# Patient Record
Sex: FEMALE | Employment: UNEMPLOYED | URBAN - METROPOLITAN AREA
[De-identification: names, ages, dates, MRNs, and addresses within clinical notes are randomized per-mention and may not be internally consistent; named-entity substitution may affect disease eponyms.]

---

## 2017-02-10 ENCOUNTER — TELEPHONE (OUTPATIENT)
Dept: FAMILY MEDICINE CLINIC | Facility: CLINIC | Age: 27
End: 2017-02-10

## 2017-02-10 NOTE — TELEPHONE ENCOUNTER
[facility] said that they have received a fax with pt office visit notes but the office notes needs to explain why the CT was order stat. They must have this information asap.   GILLIAN:[phone]

## 2017-06-08 ENCOUNTER — OFFICE VISIT (OUTPATIENT)
Dept: FAMILY MEDICINE CLINIC | Facility: CLINIC | Age: 27
End: 2017-06-08

## 2017-06-08 VITALS
SYSTOLIC BLOOD PRESSURE: 108 MMHG | DIASTOLIC BLOOD PRESSURE: 75 MMHG | HEIGHT: 65 IN | TEMPERATURE: 98 F | HEART RATE: 80 BPM

## 2017-06-08 DIAGNOSIS — J06.9 ACUTE URI: ICD-10-CM

## 2017-06-08 DIAGNOSIS — B36.0 TINEA VERSICOLOR: ICD-10-CM

## 2017-06-08 DIAGNOSIS — R30.0 DYSURIA: Primary | ICD-10-CM

## 2017-06-08 DIAGNOSIS — Z87.448 HX OF PYELONEPHRITIS: ICD-10-CM

## 2017-06-08 DIAGNOSIS — N30.01 ACUTE CYSTITIS WITH HEMATURIA: ICD-10-CM

## 2017-06-08 PROCEDURE — 81002 URINALYSIS NONAUTO W/O SCOPE: CPT | Performed by: FAMILY MEDICINE

## 2017-06-08 PROCEDURE — 99214 OFFICE O/P EST MOD 30 MIN: CPT | Performed by: FAMILY MEDICINE

## 2017-06-08 PROCEDURE — 99213 OFFICE O/P EST LOW 20 MIN: CPT | Performed by: FAMILY MEDICINE

## 2017-06-08 RX ORDER — CIPROFLOXACIN 250 MG/1
250 TABLET, FILM COATED ORAL 2 TIMES DAILY
Qty: 10 TABLET | Refills: 0 | Status: SHIPPED | OUTPATIENT
Start: 2017-06-08 | End: 2017-06-18

## 2017-06-08 RX ORDER — NITROFURANTOIN 25; 75 MG/1; MG/1
100 CAPSULE ORAL 2 TIMES DAILY
Qty: 14 CAPSULE | Refills: 0 | Status: SHIPPED | OUTPATIENT
Start: 2017-06-08 | End: 2017-06-15

## 2017-06-08 RX ORDER — SELENIUM SULFIDE 2.5 MG/100ML
LOTION TOPICAL
Qty: 1 BOTTLE | Refills: 3 | Status: SHIPPED | OUTPATIENT
Start: 2017-06-08

## 2017-06-08 NOTE — PATIENT INSTRUCTIONS
Tinea Versicolor  This is a rash caused by a fungus in the top layers of the skin. This fungus is normally present in the pores of the skin and causes no symptoms. But when the fungus overgrows it causes a rash.  The fungus grows more easily in hot climat · Wear loose clothing. Try to let your skin stay cool and breathe. · Use sunscreen and protect yourself from sunlight  · Avoid tanning beds  Follow-up care  Follow up with your healthcare provider, or as advised.  Call your provider if the rash doesn’t get

## 2017-06-08 NOTE — TELEPHONE ENCOUNTER
Pt states that she was prescribed macrobid today for her UTI. When she looked at her records at home, she realized that she can't take macrobid. She is requesting cipro as this has worked for her in the past. Please advise on pended rx.

## 2017-06-08 NOTE — PROGRESS NOTES
Nichole Smith is a 28year old female. HPI:   Patient presents with symptoms of UTI. Complaining of urinary frequency, urgency, dysuria, suprapubic pain x 5 days. Denies back pain, fever, hematuria.     Has been fasting for ramadan    Patient has a prior hist increasing fluid intake, bladder emptying after intercourse. The patient indicates understanding of these issues and agrees to the plan. The patient is asked to return in 3 days if not better.  Call if fever, vomiting, worsening symptoms.    - POC Urinal

## 2017-06-12 ENCOUNTER — TELEPHONE (OUTPATIENT)
Dept: INTERNAL MEDICINE CLINIC | Facility: CLINIC | Age: 27
End: 2017-06-12

## 2017-06-12 NOTE — TELEPHONE ENCOUNTER
Spoke with pt ,  Results given. Pt already taking cipro and symptoms improving. Pt  aware to have pt call back if symptoms worsen.

## 2017-06-12 NOTE — TELEPHONE ENCOUNTER
----- Message from Katiuska Nunez MD sent at 6/11/2017 10:00 PM CDT -----  Urine culture shows Klebsiella Pneumoniae. More sensitive to ciprofloxacin.  If not improved, will recommend switching abx to cipro

## 2017-06-15 ENCOUNTER — TELEPHONE (OUTPATIENT)
Dept: FAMILY MEDICINE CLINIC | Facility: CLINIC | Age: 27
End: 2017-06-15

## 2017-06-15 ENCOUNTER — APPOINTMENT (OUTPATIENT)
Dept: LAB | Age: 27
End: 2017-06-15
Attending: FAMILY MEDICINE
Payer: COMMERCIAL

## 2017-06-15 DIAGNOSIS — N30.00 ACUTE CYSTITIS WITHOUT HEMATURIA: ICD-10-CM

## 2017-06-15 DIAGNOSIS — N30.00 ACUTE CYSTITIS WITHOUT HEMATURIA: Primary | ICD-10-CM

## 2017-06-15 PROCEDURE — 87086 URINE CULTURE/COLONY COUNT: CPT

## 2017-06-15 NOTE — TELEPHONE ENCOUNTER
Actions Requested: Pt asking how to proceed, having continued symptoms of UTI  Situation/Background   Problem: Burning with urination, urgency, frequency   Onset: Early June   Associated Symptoms: Denies fever, urine is clear.  Denies abd and back pain   Hi

## 2017-06-15 NOTE — TELEPHONE ENCOUNTER
Spoke to pt, informed her that  has ordered another urine culture. Pt agrees to plan, will go to lab to give sample.

## 2017-06-21 ENCOUNTER — TELEPHONE (OUTPATIENT)
Dept: FAMILY MEDICINE CLINIC | Facility: CLINIC | Age: 27
End: 2017-06-21

## 2017-06-21 NOTE — TELEPHONE ENCOUNTER
----- Message from Renny Romero MD sent at 6/17/2017  1:13 PM CDT -----  Urine culture shows no growth.   Please call to inform patient

## 2017-10-10 ENCOUNTER — OFFICE VISIT (OUTPATIENT)
Dept: FAMILY MEDICINE CLINIC | Facility: CLINIC | Age: 27
End: 2017-10-10

## 2017-10-10 ENCOUNTER — TELEPHONE (OUTPATIENT)
Dept: FAMILY MEDICINE CLINIC | Facility: CLINIC | Age: 27
End: 2017-10-10

## 2017-10-10 VITALS
DIASTOLIC BLOOD PRESSURE: 75 MMHG | TEMPERATURE: 99 F | SYSTOLIC BLOOD PRESSURE: 109 MMHG | BODY MASS INDEX: 32.82 KG/M2 | HEIGHT: 65 IN | WEIGHT: 197 LBS | HEART RATE: 73 BPM

## 2017-10-10 DIAGNOSIS — Z23 NEEDS FLU SHOT: ICD-10-CM

## 2017-10-10 DIAGNOSIS — R30.0 DYSURIA: Primary | ICD-10-CM

## 2017-10-10 DIAGNOSIS — N30.90 CYSTITIS: ICD-10-CM

## 2017-10-10 PROCEDURE — 90471 IMMUNIZATION ADMIN: CPT | Performed by: NURSE PRACTITIONER

## 2017-10-10 PROCEDURE — 81002 URINALYSIS NONAUTO W/O SCOPE: CPT | Performed by: NURSE PRACTITIONER

## 2017-10-10 PROCEDURE — 99214 OFFICE O/P EST MOD 30 MIN: CPT | Performed by: NURSE PRACTITIONER

## 2017-10-10 PROCEDURE — 90686 IIV4 VACC NO PRSV 0.5 ML IM: CPT | Performed by: NURSE PRACTITIONER

## 2017-10-10 RX ORDER — CIPROFLOXACIN 500 MG/1
500 TABLET, FILM COATED ORAL 2 TIMES DAILY
Qty: 14 TABLET | Refills: 0 | Status: SHIPPED | OUTPATIENT
Start: 2017-10-10 | End: 2017-10-17

## 2017-10-10 NOTE — PATIENT INSTRUCTIONS
URINARY TRACT INFECTION    -encourage fluids 8-12 glasses of non-caffeinated fluids/day  -encourage drinking cranberry juice  -urinate after intercourse  -keep good hygiene, avoid harsh soaps and lotions to perineal area  -females-wipe front to back  -lidia

## 2017-10-10 NOTE — TELEPHONE ENCOUNTER
Spoke with pharmacist and advised that pt was told to stop tizanadine-confirmed 1/2 life is 2 hours so ok to use Cipro as long as pt is not taking tizanadine.

## 2017-10-10 NOTE — PROGRESS NOTES
UTI   This is a new problem. The current episode started yesterday. The problem occurs constantly. The problem has been unchanged. Associated symptoms include urinary symptoms. Pertinent negatives include no fatigue, fever or headaches.  Nothing aggravates needed. Disp:  Rfl:    Levonorgestrel (MIRENA) 20 MCG/24HR Intrauterine IUD 1 each by Intrauterine route once.  Put in 8/13/2014 Disp:  Rfl:    selenium sulfide 2.5 % External Lotion Apply and lather leave on 10 mins and rinse daily for 1 week and then we worsen or are not resolving. Discussed plan of care with pt and pt is in agreement. All questions answered. Pt to call with questions or concerns.     Orders Placed This Encounter      POC Urinalysis, Manual Dip without microscopy [[id number]]      [place]

## 2017-10-10 NOTE — TELEPHONE ENCOUNTER
Erik calling to advise RACHNA Jessica that the medication CIPROL & TIZANIDINE cause a drug interaction.

## 2018-03-01 ENCOUNTER — TELEPHONE (OUTPATIENT)
Dept: UROLOGY | Facility: MEDICAL CENTER | Age: 28
End: 2018-03-01

## 2018-07-24 PROCEDURE — 87077 CULTURE AEROBIC IDENTIFY: CPT | Performed by: PHYSICIAN ASSISTANT

## 2018-07-24 PROCEDURE — 87186 SC STD MICRODIL/AGAR DIL: CPT | Performed by: PHYSICIAN ASSISTANT

## 2018-07-24 PROCEDURE — 87086 URINE CULTURE/COLONY COUNT: CPT | Performed by: PHYSICIAN ASSISTANT

## 2018-08-03 ENCOUNTER — TELEPHONE (OUTPATIENT)
Dept: UROLOGY | Facility: AMBULATORY SURGERY CENTER | Age: 28
End: 2018-08-03

## 2018-08-03 NOTE — TELEPHONE ENCOUNTER
Please  Send Urology records to     [facility] (Urology)   [address]  [facility] [place], [address]

## 2022-09-09 ENCOUNTER — HOSPITAL ENCOUNTER (OUTPATIENT)
Dept: GENERAL RADIOLOGY | Age: 32
Discharge: HOME | End: 2022-09-09
Attending: FAMILY MEDICINE
Payer: COMMERCIAL

## 2022-09-09 DIAGNOSIS — R10.9 ABDOMINAL PAIN, UNSPECIFIED ABDOMINAL LOCATION: ICD-10-CM

## 2022-09-09 PROCEDURE — 74018 RADEX ABDOMEN 1 VIEW: CPT | Performed by: FAMILY MEDICINE

## (undated) NOTE — MR AVS SNAPSHOT
[facility] SPECIALTY [facility] [place] [address] [address] [id number] [phone]               Thank you for choosing us for your health care visit with Jihan Hanson.  MD Mikaela.  We are glad to serve you and happy to provide you with this summary of yo This rash is harmless and usually causes no symptoms. The only reason for treatment is to improve appearance. Follow the advice below to clear the rash. It might take several months for normal skin color to return.   Home care  · Use a medicated dandruff sh Allergies as of Jun 08, 2017     Not on File                Today's Vital Signs     BP Pulse Temp Height Breastfeeding?        108/75 mmHg 80 98.1 °F (36.7 °C) (Oral) 5' 5\" (1.651 m) No          Current Medications          This list is accurate as of